# Patient Record
Sex: FEMALE | URBAN - METROPOLITAN AREA
[De-identification: names, ages, dates, MRNs, and addresses within clinical notes are randomized per-mention and may not be internally consistent; named-entity substitution may affect disease eponyms.]

---

## 2019-01-30 ENCOUNTER — APPOINTMENT (RX ONLY)
Dept: URBAN - METROPOLITAN AREA OTHER 13 | Facility: OTHER | Age: 74
Setting detail: DERMATOLOGY
End: 2019-01-30

## 2019-01-30 DIAGNOSIS — L68.0 HIRSUTISM: ICD-10-CM

## 2019-01-30 DIAGNOSIS — L70.8 OTHER ACNE: ICD-10-CM

## 2019-01-30 DIAGNOSIS — L81.0 POSTINFLAMMATORY HYPERPIGMENTATION: ICD-10-CM

## 2019-01-30 PROCEDURE — ? COUNSELING

## 2019-01-30 PROCEDURE — 99213 OFFICE O/P EST LOW 20 MIN: CPT

## 2019-01-30 PROCEDURE — ? PRESCRIPTION

## 2019-01-30 RX ORDER — HYDROQUINONE 4 %
CREAM (GRAM) TOPICAL
Qty: 1 | Refills: 3 | Status: ERX | COMMUNITY
Start: 2019-01-30

## 2019-01-30 RX ORDER — DOXYCYCLINE HYCLATE 100 MG/1
CAPSULE, GELATIN COATED ORAL
Qty: 30 | Refills: 3 | Status: ERX | COMMUNITY
Start: 2019-01-30

## 2019-01-30 RX ORDER — CLINDAMYCIN PHOSPHATE 10 MG/G
GEL TOPICAL
Qty: 1 | Refills: 1 | Status: ERX | COMMUNITY
Start: 2019-01-30

## 2019-01-30 RX ADMIN — CLINDAMYCIN PHOSPHATE: 10 GEL TOPICAL at 15:08

## 2019-01-30 RX ADMIN — Medication: at 15:07

## 2019-01-30 RX ADMIN — DOXYCYCLINE HYCLATE: 100 CAPSULE, GELATIN COATED ORAL at 15:09

## 2019-01-30 ASSESSMENT — LOCATION ZONE DERM: LOCATION ZONE: FACE

## 2019-01-30 ASSESSMENT — LOCATION SIMPLE DESCRIPTION DERM
LOCATION SIMPLE: CHIN
LOCATION SIMPLE: LEFT CHEEK
LOCATION SIMPLE: RIGHT CHEEK

## 2019-01-30 ASSESSMENT — LOCATION DETAILED DESCRIPTION DERM
LOCATION DETAILED: LEFT INFERIOR CENTRAL MALAR CHEEK
LOCATION DETAILED: LEFT SUPERIOR CENTRAL BUCCAL CHEEK
LOCATION DETAILED: RIGHT MEDIAL BUCCAL CHEEK
LOCATION DETAILED: RIGHT INFERIOR CENTRAL MALAR CHEEK
LOCATION DETAILED: RIGHT CHIN

## 2019-04-01 ENCOUNTER — APPOINTMENT (RX ONLY)
Dept: URBAN - METROPOLITAN AREA OTHER 13 | Facility: OTHER | Age: 74
Setting detail: DERMATOLOGY
End: 2019-04-01

## 2019-04-01 DIAGNOSIS — L81.0 POSTINFLAMMATORY HYPERPIGMENTATION: ICD-10-CM

## 2019-04-01 PROCEDURE — ? COUNSELING

## 2019-04-01 PROCEDURE — 99212 OFFICE O/P EST SF 10 MIN: CPT

## 2019-04-01 ASSESSMENT — LOCATION DETAILED DESCRIPTION DERM
LOCATION DETAILED: RIGHT INFERIOR CENTRAL MALAR CHEEK
LOCATION DETAILED: RIGHT CHIN
LOCATION DETAILED: LEFT INFERIOR CENTRAL MALAR CHEEK

## 2019-04-01 ASSESSMENT — LOCATION SIMPLE DESCRIPTION DERM
LOCATION SIMPLE: CHIN
LOCATION SIMPLE: LEFT CHEEK
LOCATION SIMPLE: RIGHT CHEEK

## 2019-04-01 ASSESSMENT — LOCATION ZONE DERM: LOCATION ZONE: FACE

## 2020-07-10 ENCOUNTER — OFFICE VISIT (OUTPATIENT)
Dept: URBAN - METROPOLITAN AREA CLINIC 54 | Facility: CLINIC | Age: 75
End: 2020-07-10

## 2020-07-10 RX ORDER — ROSUVASTATIN CALCIUM 40 MG/1
TABLET, FILM COATED ORAL
Qty: 0 | Refills: 0 | COMMUNITY
Start: 1900-01-01

## 2020-07-10 RX ORDER — CHLORHEXIDINE GLUCONATE 4 %
LIQUID (ML) TOPICAL
Qty: 0 | Refills: 0 | COMMUNITY
Start: 1900-01-01

## 2020-07-10 RX ORDER — ESTROGENS, CONJUGATED 0.62 MG/1
TABLET, FILM COATED ORAL
Qty: 0 | Refills: 0 | COMMUNITY
Start: 1900-01-01

## 2020-07-10 RX ORDER — SUCRALFATE 1 G/1
TABLET ORAL
Qty: 0 | Refills: 0 | COMMUNITY
Start: 1900-01-01

## 2020-07-10 RX ORDER — BUPROPION HYDROCHLORIDE 150 MG/1
TABLET, FILM COATED, EXTENDED RELEASE ORAL
Qty: 0 | Refills: 0 | COMMUNITY
Start: 1900-01-01

## 2020-07-10 RX ORDER — PANTOPRAZOLE SODIUM 40 MG/1
TAKE 1 TABLET BY ORAL ROUTE DAILY FOR 90 DAYS TABLET, DELAYED RELEASE ORAL 1
Qty: 90 | Refills: 2 | COMMUNITY
Start: 2020-03-06 | End: 2020-12-01

## 2020-07-10 RX ORDER — MILNACIPRAN HYDROCHLORIDE 50 MG/1
TABLET, FILM COATED ORAL
Qty: 0 | Refills: 0 | COMMUNITY
Start: 1900-01-01

## 2020-07-10 RX ORDER — ASPIRIN 81 MG/1
TABLET, COATED ORAL
Qty: 0 | Refills: 0 | COMMUNITY
Start: 1900-01-01

## 2020-07-20 ENCOUNTER — OFFICE VISIT (OUTPATIENT)
Dept: URBAN - METROPOLITAN AREA CLINIC 54 | Facility: CLINIC | Age: 75
End: 2020-07-20
Payer: MEDICARE

## 2020-07-20 DIAGNOSIS — K58.9 IBS (IRRITABLE BOWEL SYNDROME): ICD-10-CM

## 2020-07-20 DIAGNOSIS — K21.9 GERD: ICD-10-CM

## 2020-07-20 DIAGNOSIS — E66.9 OBESITY: ICD-10-CM

## 2020-07-20 DIAGNOSIS — Z12.11 ENCOUNTER FOR SCREENING COLONOSCOPY: ICD-10-CM

## 2020-07-20 DIAGNOSIS — K76.0 FATTY LIVER: ICD-10-CM

## 2020-07-20 PROCEDURE — 99214 OFFICE O/P EST MOD 30 MIN: CPT | Performed by: INTERNAL MEDICINE

## 2020-07-20 PROCEDURE — 83036 HEMOGLOBIN GLYCOSYLATED A1C: CPT | Performed by: INTERNAL MEDICINE

## 2020-07-20 RX ORDER — ROSUVASTATIN CALCIUM 40 MG/1
TABLET, FILM COATED ORAL
Qty: 0 | Refills: 0 | Status: ACTIVE | COMMUNITY
Start: 1900-01-01

## 2020-07-20 RX ORDER — SUCRALFATE 1 G/1
TABLET ORAL
Qty: 0 | Refills: 0 | COMMUNITY
Start: 1900-01-01

## 2020-07-20 RX ORDER — CHLORHEXIDINE GLUCONATE 4 %
LIQUID (ML) TOPICAL
Qty: 0 | Refills: 0 | Status: ACTIVE | COMMUNITY
Start: 1900-01-01

## 2020-07-20 RX ORDER — MILNACIPRAN HYDROCHLORIDE 50 MG/1
TABLET, FILM COATED ORAL
Qty: 0 | Refills: 0 | Status: ACTIVE | COMMUNITY
Start: 1900-01-01

## 2020-07-20 RX ORDER — BUPROPION HYDROCHLORIDE 150 MG/1
TABLET, FILM COATED, EXTENDED RELEASE ORAL
Qty: 0 | Refills: 0 | Status: ACTIVE | COMMUNITY
Start: 1900-01-01

## 2020-07-20 RX ORDER — PANTOPRAZOLE SODIUM 40 MG/1
1 TABLET TABLET, DELAYED RELEASE ORAL ONCE A DAY
Refills: 2 | Status: ACTIVE | COMMUNITY
Start: 2020-03-06

## 2020-07-20 RX ORDER — ESTROGENS, CONJUGATED 0.62 MG/1
TABLET, FILM COATED ORAL
Qty: 0 | Refills: 0 | Status: ACTIVE | COMMUNITY
Start: 1900-01-01

## 2020-07-20 RX ORDER — ASPIRIN 81 MG/1
TABLET, COATED ORAL
Qty: 0 | Refills: 0 | Status: ACTIVE | COMMUNITY
Start: 1900-01-01

## 2020-07-20 NOTE — HPI-TODAY'S VISIT:
The patient is a 73 year old female with PMH of CAD, HTN, HLD, DM II, obesity, s/p Nissen fundoplication, anxiety, depression, arthritis and anemia, who presents on referral from [facility], for a gastroenterology evaluation for above issues. She was recently hospitalized from 3/25-2/27 with similar complaints and  underwent cardiac evaluation. She had a LHC on 3/25 that showed Ost LM lesion is 50% stenosed. She is currenlty managed on ASA. She reports chronic intermittent chest pressure with associated heartburn, indigestion, reflux, nausea and dysphagia, now worse in past 2 months. She has been taking Protonix, Zantac and Carafate and has not been getting much relief. She sleeps on a wedge pillow due to reflux. Reports dysphagia to pills only. Denies any vomiting, odynophagia, unintentional weight loss. Her last EGD was several years ago by Dr. Alberto that required esophageal dilatation. Her last colonoscopy was in 2017 by Dr. Alberto that was normal. She has chronic constipation and has been taking Linzess 145 mcg daily for over a year. She has a BM every 4-5 days. She also reports bloating. She is taking OTC probiotic. Denies any hematochezia or melena. No family history of GI malignancy. She denies any etoh, tobacco or illicit drug use.    Follow Up 6/20/2019: Patient presents for routine follow up. She c/o early Am nausea and fullness. She tried Amitiza which did not work. Linzess has been too strong for her in the past. EGD was negative for esophageal obstruction with intact Nissen. She is on Savella and Lyrica for fibromyalgia. She is off Amitryptiline. These symptoms have been going on for several years. She is most bothered by constipation and bloating. LFTs elevation noted. No known risk factors.  Follow Up 9/20/2019: Pateint presents to schedule colonoscopy. Bowel function is unchanged at this time with alternating and diarrhea and constipation with bloating. No fevers, chills, weight loss. Dietary recs given in past but complaince is not sure. She has gained 13 lbs but c/o poor appetite.  Follow Up 2/28/20: Patient presents for routiner follow up. She c/o heartuburn, regurgitation with intermittent dysphagia. EGD in 2019 was negative for erosive/obstructive pathology. She is on Protonix 40 QD and Ranitidine at bedtime. She sleeps on a wedge. Dinner is usually around 7 and sleeps at 10. Comorbidities include fibromyalgia, RLS. She has gained 6 lbs.  Follow Up 7/20/20: Pateint presents for routine follow up. No new complaints. She has contacted [facility] and is following the low FODMAP diet with good results.

## 2020-07-21 LAB
A/G RATIO: 1.6
ALBUMIN: 3.9
ALKALINE PHOSPHATASE: 79
ALT (SGPT): 22
AST (SGOT): 27
BASO (ABSOLUTE): 0
BASOS: 0
BILIRUBIN, TOTAL: <0.2
BUN/CREATININE RATIO: 9
BUN: 10
CALCIUM: 9.5
CARBON DIOXIDE, TOTAL: 19
CHLORIDE: 109
CREATININE: 1.07
EGFR IF AFRICN AM: 60
EGFR IF NONAFRICN AM: 52
EOS (ABSOLUTE): 0.1
EOS: 1
GLOBULIN, TOTAL: 2.5
GLUCOSE: 107
HEMATOCRIT: 36.6
HEMATOLOGY COMMENTS:: (no result)
HEMOGLOBIN A1C: 6.2
HEMOGLOBIN: 11.6
IMMATURE CELLS: (no result)
IMMATURE GRANS (ABS): 0
IMMATURE GRANULOCYTES: 0
INR: 1.1
LYMPHS (ABSOLUTE): 1.9
LYMPHS: 41
MCH: 26.8
MCHC: 31.7
MCV: 85
MONOCYTES(ABSOLUTE): 0.6
MONOCYTES: 12
NEUTROPHILS (ABSOLUTE): 2.1
NEUTROPHILS: 46
NRBC: (no result)
PLATELETS: 235
POTASSIUM: 4.2
PROTEIN, TOTAL: 6.4
PROTHROMBIN TIME: 12.1
RBC: 4.33
RDW: 13.2
SODIUM: 146
WBC: 4.6

## 2021-01-05 ENCOUNTER — TELEPHONE ENCOUNTER (OUTPATIENT)
Dept: URBAN - METROPOLITAN AREA CLINIC 54 | Facility: CLINIC | Age: 76
End: 2021-01-05

## 2021-01-08 ENCOUNTER — LAB OUTSIDE AN ENCOUNTER (OUTPATIENT)
Dept: URBAN - NONMETROPOLITAN AREA CLINIC 4 | Facility: CLINIC | Age: 76
End: 2021-01-08

## 2021-01-08 ENCOUNTER — OFFICE VISIT (OUTPATIENT)
Dept: URBAN - NONMETROPOLITAN AREA CLINIC 4 | Facility: CLINIC | Age: 76
End: 2021-01-08
Payer: MEDICARE

## 2021-01-08 VITALS
TEMPERATURE: 97.7 F | WEIGHT: 191.6 LBS | HEART RATE: 72 BPM | SYSTOLIC BLOOD PRESSURE: 157 MMHG | BODY MASS INDEX: 33.95 KG/M2 | DIASTOLIC BLOOD PRESSURE: 126 MMHG | HEIGHT: 63 IN

## 2021-01-08 DIAGNOSIS — Z12.11 ENCOUNTER FOR SCREENING COLONOSCOPY: ICD-10-CM

## 2021-01-08 DIAGNOSIS — R13.10 DYSPHAGIA: ICD-10-CM

## 2021-01-08 DIAGNOSIS — K58.9 IBS (IRRITABLE BOWEL SYNDROME): ICD-10-CM

## 2021-01-08 DIAGNOSIS — K76.0 FATTY LIVER: ICD-10-CM

## 2021-01-08 DIAGNOSIS — E66.9 OBESITY: ICD-10-CM

## 2021-01-08 DIAGNOSIS — K21.9 GERD: ICD-10-CM

## 2021-01-08 PROCEDURE — 99213 OFFICE O/P EST LOW 20 MIN: CPT | Performed by: REGISTERED NURSE

## 2021-01-08 PROCEDURE — G8482 FLU IMMUNIZE ORDER/ADMIN: HCPCS | Performed by: REGISTERED NURSE

## 2021-01-08 PROCEDURE — G8427 DOCREV CUR MEDS BY ELIG CLIN: HCPCS | Performed by: REGISTERED NURSE

## 2021-01-08 PROCEDURE — 3017F COLORECTAL CA SCREEN DOC REV: CPT | Performed by: REGISTERED NURSE

## 2021-01-08 PROCEDURE — G8417 CALC BMI ABV UP PARAM F/U: HCPCS | Performed by: REGISTERED NURSE

## 2021-01-08 PROCEDURE — 1036F TOBACCO NON-USER: CPT | Performed by: REGISTERED NURSE

## 2021-01-08 RX ORDER — PANTOPRAZOLE SODIUM 40 MG/1
1 TABLET TABLET, DELAYED RELEASE ORAL ONCE A DAY
Refills: 2 | Status: ACTIVE | COMMUNITY

## 2021-01-08 RX ORDER — LINAGLIPTIN 5 MG/1
1 TABLET TABLET, FILM COATED ORAL ONCE A DAY
Status: ACTIVE | COMMUNITY

## 2021-01-08 RX ORDER — CONJUGATED ESTROGENS 0.62 MG/G
AS DIRECTED CREAM VAGINAL
Status: ACTIVE | COMMUNITY

## 2021-01-08 RX ORDER — PANTOPRAZOLE SODIUM 40 MG/1
1 TABLET TABLET, DELAYED RELEASE ORAL TWICE DAILY
Qty: 60 | Refills: 2 | OUTPATIENT
Start: 2021-01-08

## 2021-01-08 RX ORDER — CHLORHEXIDINE GLUCONATE 4 %
LIQUID (ML) TOPICAL
Qty: 0 | Refills: 0 | Status: DISCONTINUED | COMMUNITY

## 2021-01-08 RX ORDER — MELATONIN 5 MG
1 TABLET IN THE EVENING TABLET ORAL ONCE A DAY
Status: ACTIVE | COMMUNITY

## 2021-01-08 RX ORDER — ASPIRIN 81 MG/1
TABLET, COATED ORAL
Qty: 0 | Refills: 0 | Status: ACTIVE | COMMUNITY

## 2021-01-08 RX ORDER — NYSTATIN 100000 [USP'U]/G
1 APPLICATION CREAM TOPICAL TWICE A DAY
Status: ACTIVE | COMMUNITY

## 2021-01-08 RX ORDER — ESTROGENS, CONJUGATED 0.62 MG/1
TABLET, FILM COATED ORAL
Qty: 0 | Refills: 0 | Status: DISCONTINUED | COMMUNITY

## 2021-01-08 RX ORDER — DOXYCYCLINE HYCLATE 100 MG/1
1 TABLET TABLET, DELAYED RELEASE ORAL ONCE A DAY
Status: ACTIVE | COMMUNITY

## 2021-01-08 RX ORDER — PREGABALIN 200 MG/1
1 CAPSULE 1 TO 3 HOURS BEFORE BEDTIME CAPSULE ORAL ONCE A DAY
Status: ACTIVE | COMMUNITY

## 2021-01-08 RX ORDER — BUPROPION HYDROCHLORIDE 150 MG/1
TABLET, FILM COATED, EXTENDED RELEASE ORAL
Qty: 0 | Refills: 0 | Status: ACTIVE | COMMUNITY

## 2021-01-08 RX ORDER — ISOSORBIDE MONONITRATE 60 MG/1
1 TABLET IN THE MORNING TABLET, EXTENDED RELEASE ORAL ONCE A DAY
Status: ACTIVE | COMMUNITY

## 2021-01-08 RX ORDER — ROSUVASTATIN CALCIUM 40 MG/1
TABLET, FILM COATED ORAL
Qty: 0 | Refills: 0 | Status: ACTIVE | COMMUNITY

## 2021-01-08 RX ORDER — FAMOTIDINE 40 MG/1
1 TABLET AT BEDTIME TABLET, FILM COATED ORAL ONCE A DAY
Status: ACTIVE | COMMUNITY

## 2021-01-08 RX ORDER — HYDROXYZINE HYDROCHLORIDE 25 MG/1
1 TABLET AS NEEDED TABLET, FILM COATED ORAL
Status: ACTIVE | COMMUNITY

## 2021-01-08 RX ORDER — SUCRALFATE 1 G/1
TABLET ORAL
Qty: 0 | Refills: 0 | Status: ACTIVE | COMMUNITY

## 2021-01-08 RX ORDER — MILNACIPRAN HYDROCHLORIDE 50 MG/1
TABLET, FILM COATED ORAL
Qty: 0 | Refills: 0 | Status: ACTIVE | COMMUNITY

## 2021-01-08 RX ORDER — PANTOPRAZOLE SODIUM 40 MG/1
1 TABLET TABLET, DELAYED RELEASE ORAL ONCE A DAY
Qty: 30 TABLET | Refills: 0 | OUTPATIENT

## 2021-01-08 NOTE — PHYSICAL EXAM HENT:
Head,  normocephalic,  atraumatic,  Face,  Face within normal limits,  Ears,  External ears within normal limits, [place]

## 2021-01-08 NOTE — HPI-TODAY'S VISIT:
The patient is a 73 year old female with PMH of CAD, HTN, HLD, DM II, obesity, s/p Nissen fundoplication, anxiety, depression, arthritis and anemia, who presents on referral from [facility], for a gastroenterology evaluation for above issues. She was recently hospitalized from 3/25-2/27 with similar complaints and  underwent cardiac evaluation. She had a LHC on 3/25 that showed Ost LM lesion is 50% stenosed. She is currenlty managed on ASA. She reports chronic intermittent chest pressure with associated heartburn, indigestion, reflux, nausea and dysphagia, now worse in past 2 months. She has been taking Protonix, Zantac and Carafate and has not been getting much relief. She sleeps on a wedge pillow due to reflux. Reports dysphagia to pills only. Denies any vomiting, odynophagia, unintentional weight loss. Her last EGD was several years ago by Dr. Alberto that required esophageal dilatation. Her last colonoscopy was in 2017 by Dr. Alberto that was normal. She has chronic constipation and has been taking Linzess 145 mcg daily for over a year. She has a BM every 4-5 days. She also reports bloating. She is taking OTC probiotic. Denies any hematochezia or melena. No family history of GI malignancy. She denies any etoh, tobacco or illicit drug use.    Follow Up 6/20/2019: Patient presents for routine follow up. She c/o early Am nausea and fullness. She tried Amitiza which did not work. Linzess has been too strong for her in the past. EGD was negative for esophageal obstruction with intact Nissen. She is on Savella and Lyrica for fibromyalgia. She is off Amitryptiline. These symptoms have been going on for several years. She is most bothered by constipation and bloating. LFTs elevation noted. No known risk factors.  Follow Up 9/20/2019: Pateint presents to schedule colonoscopy. Bowel function is unchanged at this time with alternating and diarrhea and constipation with bloating. No fevers, chills, weight loss. Dietary recs given in past but complaince is not sure. She has gained 13 lbs but c/o poor appetite.  Follow Up 2/28/20: Patient presents for routiner follow up. She c/o heartuburn, regurgitation with intermittent dysphagia. EGD in 2019 was negative for erosive/obstructive pathology. She is on Protonix 40 QD and Ranitidine at bedtime. She sleeps on a wedge. Dinner is usually around 7 and sleeps at 10. Comorbidities include fibromyalgia, RLS. She has gained 6 lbs.  Follow Up 7/20/20: Pateint presents for routine follow up. No new complaints. She has contacted [facility] and is following the low FODMAP diet with good results.  FOllow up 1/8/21: Pt presents with c/o dysphagia, nausea, esophageal spasms and worsening acid reflux. She currently takes Protonix 40 mg daily and Pepcid 40 mg at bedtime, which is not helping. States she had an episode of choking recently and had to have heimlich maneuvor performed.

## 2021-02-16 ENCOUNTER — OFFICE VISIT (OUTPATIENT)
Dept: URBAN - METROPOLITAN AREA SURGERY CENTER 14 | Facility: SURGERY CENTER | Age: 76
End: 2021-02-16
Payer: MEDICARE

## 2021-02-16 ENCOUNTER — CLAIMS CREATED FROM THE CLAIM WINDOW (OUTPATIENT)
Dept: URBAN - METROPOLITAN AREA CLINIC 4 | Facility: CLINIC | Age: 76
End: 2021-02-16
Payer: MEDICARE

## 2021-02-16 DIAGNOSIS — R13.19 CERVICAL DYSPHAGIA: ICD-10-CM

## 2021-02-16 DIAGNOSIS — K21.00 GASTRO-ESOPHAGEAL REFLUX DISEASE WITH ESOPHAGITIS, WITHOUT BLEEDING: ICD-10-CM

## 2021-02-16 DIAGNOSIS — K21.9 ACID REFLUX: ICD-10-CM

## 2021-02-16 PROCEDURE — 88305 TISSUE EXAM BY PATHOLOGIST: CPT | Performed by: PATHOLOGY

## 2021-02-16 PROCEDURE — 88312 SPECIAL STAINS GROUP 1: CPT | Performed by: PATHOLOGY

## 2021-02-16 PROCEDURE — 43239 EGD BIOPSY SINGLE/MULTIPLE: CPT | Performed by: INTERNAL MEDICINE

## 2021-02-16 PROCEDURE — 43248 EGD GUIDE WIRE INSERTION: CPT | Performed by: INTERNAL MEDICINE

## 2021-02-16 PROCEDURE — G8907 PT DOC NO EVENTS ON DISCHARG: HCPCS | Performed by: INTERNAL MEDICINE

## 2021-02-16 RX ORDER — ISOSORBIDE MONONITRATE 60 MG/1
1 TABLET IN THE MORNING TABLET, EXTENDED RELEASE ORAL ONCE A DAY
Status: ACTIVE | COMMUNITY

## 2021-02-16 RX ORDER — HYDROXYZINE HYDROCHLORIDE 25 MG/1
1 TABLET AS NEEDED TABLET, FILM COATED ORAL
Status: ACTIVE | COMMUNITY

## 2021-02-16 RX ORDER — ASPIRIN 81 MG/1
TABLET, COATED ORAL
Qty: 0 | Refills: 0 | Status: ACTIVE | COMMUNITY

## 2021-02-16 RX ORDER — DOXYCYCLINE HYCLATE 100 MG/1
1 TABLET TABLET, DELAYED RELEASE ORAL ONCE A DAY
Status: ACTIVE | COMMUNITY

## 2021-02-16 RX ORDER — CONJUGATED ESTROGENS 0.62 MG/G
AS DIRECTED CREAM VAGINAL
Status: ACTIVE | COMMUNITY

## 2021-02-16 RX ORDER — MELATONIN 5 MG
1 TABLET IN THE EVENING TABLET ORAL ONCE A DAY
Status: ACTIVE | COMMUNITY

## 2021-02-16 RX ORDER — SUCRALFATE 1 G/1
TABLET ORAL
Qty: 0 | Refills: 0 | Status: ACTIVE | COMMUNITY

## 2021-02-16 RX ORDER — PREGABALIN 200 MG/1
1 CAPSULE 1 TO 3 HOURS BEFORE BEDTIME CAPSULE ORAL ONCE A DAY
Status: ACTIVE | COMMUNITY

## 2021-02-16 RX ORDER — NYSTATIN 100000 [USP'U]/G
1 APPLICATION CREAM TOPICAL TWICE A DAY
Status: ACTIVE | COMMUNITY

## 2021-02-16 RX ORDER — LINAGLIPTIN 5 MG/1
1 TABLET TABLET, FILM COATED ORAL ONCE A DAY
Status: ACTIVE | COMMUNITY

## 2021-02-16 RX ORDER — PANTOPRAZOLE SODIUM 40 MG/1
1 TABLET TABLET, DELAYED RELEASE ORAL TWICE DAILY
Qty: 60 | Refills: 2 | Status: ACTIVE | COMMUNITY
Start: 2021-01-08

## 2021-02-16 RX ORDER — ROSUVASTATIN CALCIUM 40 MG/1
TABLET, FILM COATED ORAL
Qty: 0 | Refills: 0 | Status: ACTIVE | COMMUNITY

## 2021-02-16 RX ORDER — MILNACIPRAN HYDROCHLORIDE 50 MG/1
TABLET, FILM COATED ORAL
Qty: 0 | Refills: 0 | Status: ACTIVE | COMMUNITY

## 2021-02-16 RX ORDER — BUPROPION HYDROCHLORIDE 150 MG/1
TABLET, FILM COATED, EXTENDED RELEASE ORAL
Qty: 0 | Refills: 0 | Status: ACTIVE | COMMUNITY

## 2021-02-16 RX ORDER — FAMOTIDINE 40 MG/1
1 TABLET AT BEDTIME TABLET, FILM COATED ORAL ONCE A DAY
Status: ACTIVE | COMMUNITY

## 2021-03-09 ENCOUNTER — OFFICE VISIT (OUTPATIENT)
Dept: URBAN - NONMETROPOLITAN AREA CLINIC 4 | Facility: CLINIC | Age: 76
End: 2021-03-09

## 2021-03-12 ENCOUNTER — OFFICE VISIT (OUTPATIENT)
Dept: URBAN - METROPOLITAN AREA CLINIC 54 | Facility: CLINIC | Age: 76
End: 2021-03-12
Payer: MEDICARE

## 2021-03-12 ENCOUNTER — WEB ENCOUNTER (OUTPATIENT)
Dept: URBAN - METROPOLITAN AREA CLINIC 54 | Facility: CLINIC | Age: 76
End: 2021-03-12

## 2021-03-12 DIAGNOSIS — K21.9 GERD: ICD-10-CM

## 2021-03-12 DIAGNOSIS — K58.9 IBS (IRRITABLE BOWEL SYNDROME): ICD-10-CM

## 2021-03-12 DIAGNOSIS — R13.19 CERVICAL DYSPHAGIA: ICD-10-CM

## 2021-03-12 DIAGNOSIS — K76.0 FATTY (CHANGE OF) LIVER, NOT ELSEWHERE CLASSIFIED: ICD-10-CM

## 2021-03-12 PROBLEM — 10743008: Status: ACTIVE | Noted: 2021-03-12

## 2021-03-12 PROBLEM — 313436004: Status: ACTIVE | Noted: 2021-03-12

## 2021-03-12 PROBLEM — 72042002: Status: ACTIVE | Noted: 2021-03-12

## 2021-03-12 PROBLEM — 414916001 OBESITY: Status: ACTIVE | Noted: 2021-03-12

## 2021-03-12 PROBLEM — 302914006: Status: ACTIVE | Noted: 2021-03-12

## 2021-03-12 PROCEDURE — 91065 BREATH HYDROGEN/METHANE TEST: CPT | Performed by: INTERNAL MEDICINE

## 2021-03-12 PROCEDURE — 99214 OFFICE O/P EST MOD 30 MIN: CPT | Performed by: INTERNAL MEDICINE

## 2021-03-12 RX ORDER — PANTOPRAZOLE SODIUM 40 MG/1
1 TABLET TABLET, DELAYED RELEASE ORAL TWICE DAILY
Qty: 60 | Refills: 2 | Status: ACTIVE | COMMUNITY
Start: 2021-01-08

## 2021-03-12 RX ORDER — CONJUGATED ESTROGENS 0.62 MG/G
AS DIRECTED CREAM VAGINAL
Status: ACTIVE | COMMUNITY

## 2021-03-12 RX ORDER — DOXYCYCLINE HYCLATE 100 MG/1
1 TABLET TABLET, DELAYED RELEASE ORAL ONCE A DAY
Status: ACTIVE | COMMUNITY

## 2021-03-12 RX ORDER — LINAGLIPTIN 5 MG/1
1 TABLET TABLET, FILM COATED ORAL ONCE A DAY
Status: ACTIVE | COMMUNITY

## 2021-03-12 RX ORDER — SUCRALFATE 1 G/1
TABLET ORAL
Qty: 0 | Refills: 0 | Status: ACTIVE | COMMUNITY

## 2021-03-12 RX ORDER — HYDROXYZINE HYDROCHLORIDE 25 MG/1
1 TABLET AS NEEDED TABLET, FILM COATED ORAL
Status: ACTIVE | COMMUNITY

## 2021-03-12 RX ORDER — ISOSORBIDE MONONITRATE 60 MG/1
1 TABLET IN THE MORNING TABLET, EXTENDED RELEASE ORAL ONCE A DAY
Status: ACTIVE | COMMUNITY

## 2021-03-12 RX ORDER — BUPROPION HYDROCHLORIDE 150 MG/1
TABLET, FILM COATED, EXTENDED RELEASE ORAL
Qty: 0 | Refills: 0 | Status: ACTIVE | COMMUNITY

## 2021-03-12 RX ORDER — NYSTATIN 100000 [USP'U]/G
1 APPLICATION CREAM TOPICAL TWICE A DAY
Status: ACTIVE | COMMUNITY

## 2021-03-12 RX ORDER — ASPIRIN 81 MG/1
TABLET, COATED ORAL
Qty: 0 | Refills: 0 | Status: ACTIVE | COMMUNITY

## 2021-03-12 RX ORDER — MELATONIN 5 MG
1 TABLET IN THE EVENING TABLET ORAL ONCE A DAY
Status: ACTIVE | COMMUNITY

## 2021-03-12 RX ORDER — MILNACIPRAN HYDROCHLORIDE 50 MG/1
TABLET, FILM COATED ORAL
Qty: 0 | Refills: 0 | Status: ACTIVE | COMMUNITY

## 2021-03-12 RX ORDER — ROSUVASTATIN CALCIUM 40 MG/1
TABLET, FILM COATED ORAL
Qty: 0 | Refills: 0 | Status: ACTIVE | COMMUNITY

## 2021-03-12 RX ORDER — PREGABALIN 200 MG/1
1 CAPSULE 1 TO 3 HOURS BEFORE BEDTIME CAPSULE ORAL ONCE A DAY
Status: ACTIVE | COMMUNITY

## 2021-03-12 RX ORDER — FAMOTIDINE 40 MG/1
1 TABLET AT BEDTIME TABLET, FILM COATED ORAL ONCE A DAY
Status: ACTIVE | COMMUNITY

## 2021-03-12 NOTE — PHYSICAL EXAM HENT:
Head,  normocephalic,  atraumatic,  Face,  Face within normal limits,  Ears,  External ears within normal limits, [place]

## 2021-03-12 NOTE — HPI-TODAY'S VISIT:
The patient is a 73 year old female with PMH of CAD, HTN, HLD, DM II, obesity, s/p Nissen fundoplication, anxiety, depression, arthritis and anemia, who presents on referral from [facility], for a gastroenterology evaluation for above issues. She was recently hospitalized from 3/25-2/27 with similar complaints and  underwent cardiac evaluation. She had a LHC on 3/25 that showed Ost LM lesion is 50% stenosed. She is currenlty managed on ASA. She reports chronic intermittent chest pressure with associated heartburn, indigestion, reflux, nausea and dysphagia, now worse in past 2 months. She has been taking Protonix, Zantac and Carafate and has not been getting much relief. She sleeps on a wedge pillow due to reflux. Reports dysphagia to pills only. Denies any vomiting, odynophagia, unintentional weight loss. Her last EGD was several years ago by Dr. Alberto that required esophageal dilatation. Her last colonoscopy was in 2017 by Dr. Alberto that was normal. She has chronic constipation and has been taking Linzess 145 mcg daily for over a year. She has a BM every 4-5 days. She also reports bloating. She is taking OTC probiotic. Denies any hematochezia or melena. No family history of GI malignancy. She denies any etoh, tobacco or illicit drug use.    Follow Up 6/20/2019: Patient presents for routine follow up. She c/o early Am nausea and fullness. She tried Amitiza which did not work. Linzess has been too strong for her in the past. EGD was negative for esophageal obstruction with intact Nissen. She is on Savella and Lyrica for fibromyalgia. She is off Amitryptiline. These symptoms have been going on for several years. She is most bothered by constipation and bloating. LFTs elevation noted. No known risk factors.  Follow Up 9/20/2019: Pateint presents to schedule colonoscopy. Bowel function is unchanged at this time with alternating and diarrhea and constipation with bloating. No fevers, chills, weight loss. Dietary recs given in past but complaince is not sure. She has gained 13 lbs but c/o poor appetite.  Follow Up 2/28/20: Patient presents for routiner follow up. She c/o heartuburn, regurgitation with intermittent dysphagia. EGD in 2019 was negative for erosive/obstructive pathology. She is on Protonix 40 QD and Ranitidine at bedtime. She sleeps on a wedge. Dinner is usually around 7 and sleeps at 10. Comorbidities include fibromyalgia, RLS. She has gained 6 lbs.  Follow Up 7/20/20: Pateint presents for routine follow up. No new complaints. She has contacted [facility] and is following the low FODMAP diet with good results.  FOllow up 1/8/21: Pt presents with c/o dysphagia, nausea, esophageal spasms and worsening acid reflux. She currently takes Protonix 40 mg daily and Pepcid 40 mg at bedtime, which is not helping. States she had an episode of choking recently and had to have heimlich maneuvor performed.  Follow Up 3/12/21: Patient presents for post EGD follow up. EGD showed C0M2 barretts but negative pathology. Previous EGD in 2018 showed C0M1 [facility]'s and also negative pathology. Esophagus was nonobstructive and dilated with 54 F Savary with good results. She c/o fecal incontinence and has to wear pads. Symptoms going of for a year. She denies watery stool. She has tried fiber which caused ? constipation. Her last HbA1C was 7%.

## 2021-03-19 LAB — PANCREATIC ELASTASE, FECAL: 391

## 2021-03-26 ENCOUNTER — OFFICE VISIT (OUTPATIENT)
Dept: URBAN - METROPOLITAN AREA CLINIC 53 | Facility: CLINIC | Age: 76
End: 2021-03-26
Payer: MEDICARE

## 2021-03-26 DIAGNOSIS — R14.0 BLOATING: ICD-10-CM

## 2021-03-26 PROCEDURE — 91065 BREATH HYDROGEN/METHANE TEST: CPT | Performed by: INTERNAL MEDICINE

## 2021-03-29 ENCOUNTER — TELEPHONE ENCOUNTER (OUTPATIENT)
Dept: URBAN - METROPOLITAN AREA CLINIC 92 | Facility: CLINIC | Age: 76
End: 2021-03-29

## 2021-03-29 RX ORDER — LINAGLIPTIN 5 MG/1
1 TABLET TABLET, FILM COATED ORAL ONCE A DAY
Status: ACTIVE | COMMUNITY

## 2021-03-29 RX ORDER — CONJUGATED ESTROGENS 0.62 MG/G
AS DIRECTED CREAM VAGINAL
Status: ACTIVE | COMMUNITY

## 2021-03-29 RX ORDER — ISOSORBIDE MONONITRATE 60 MG/1
1 TABLET IN THE MORNING TABLET, EXTENDED RELEASE ORAL ONCE A DAY
Status: ACTIVE | COMMUNITY

## 2021-03-29 RX ORDER — HYDROXYZINE HYDROCHLORIDE 25 MG/1
1 TABLET AS NEEDED TABLET, FILM COATED ORAL
Status: ACTIVE | COMMUNITY

## 2021-03-29 RX ORDER — PREGABALIN 200 MG/1
1 CAPSULE 1 TO 3 HOURS BEFORE BEDTIME CAPSULE ORAL ONCE A DAY
Status: ACTIVE | COMMUNITY

## 2021-03-29 RX ORDER — MELATONIN 5 MG
1 TABLET IN THE EVENING TABLET ORAL ONCE A DAY
Status: ACTIVE | COMMUNITY

## 2021-03-29 RX ORDER — BUPROPION HYDROCHLORIDE 150 MG/1
TABLET, FILM COATED, EXTENDED RELEASE ORAL
Qty: 0 | Refills: 0 | Status: ACTIVE | COMMUNITY

## 2021-03-29 RX ORDER — NYSTATIN 100000 [USP'U]/G
1 APPLICATION CREAM TOPICAL TWICE A DAY
Status: ACTIVE | COMMUNITY

## 2021-03-29 RX ORDER — SUCRALFATE 1 G/1
TABLET ORAL
Qty: 0 | Refills: 0 | Status: ACTIVE | COMMUNITY

## 2021-03-29 RX ORDER — FAMOTIDINE 40 MG/1
1 TABLET AT BEDTIME TABLET, FILM COATED ORAL ONCE A DAY
Status: ACTIVE | COMMUNITY

## 2021-03-29 RX ORDER — NEOMYCIN SULFATE 500 MG/1
1 TABLET TABLET ORAL BID
Qty: 28 TABLET | Refills: 0 | OUTPATIENT
Start: 2021-03-29

## 2021-03-29 RX ORDER — ROSUVASTATIN CALCIUM 40 MG/1
TABLET, FILM COATED ORAL
Qty: 0 | Refills: 0 | Status: ACTIVE | COMMUNITY

## 2021-03-29 RX ORDER — PANTOPRAZOLE SODIUM 40 MG/1
1 TABLET TABLET, DELAYED RELEASE ORAL TWICE DAILY
Qty: 60 | Refills: 2 | Status: ACTIVE | COMMUNITY
Start: 2021-01-08

## 2021-03-29 RX ORDER — MILNACIPRAN HYDROCHLORIDE 50 MG/1
TABLET, FILM COATED ORAL
Qty: 0 | Refills: 0 | Status: ACTIVE | COMMUNITY

## 2021-03-29 RX ORDER — ASPIRIN 81 MG/1
TABLET, COATED ORAL
Qty: 0 | Refills: 0 | Status: ACTIVE | COMMUNITY

## 2021-03-29 RX ORDER — DOXYCYCLINE HYCLATE 100 MG/1
1 TABLET TABLET, DELAYED RELEASE ORAL ONCE A DAY
Status: ACTIVE | COMMUNITY

## 2021-04-20 ENCOUNTER — TELEPHONE ENCOUNTER (OUTPATIENT)
Dept: URBAN - METROPOLITAN AREA CLINIC 54 | Facility: CLINIC | Age: 76
End: 2021-04-20

## 2021-04-22 ENCOUNTER — LAB OUTSIDE AN ENCOUNTER (OUTPATIENT)
Dept: URBAN - METROPOLITAN AREA CLINIC 54 | Facility: CLINIC | Age: 76
End: 2021-04-22

## 2021-05-04 ENCOUNTER — TELEPHONE ENCOUNTER (OUTPATIENT)
Dept: URBAN - METROPOLITAN AREA CLINIC 92 | Facility: CLINIC | Age: 76
End: 2021-05-04

## 2021-06-11 ENCOUNTER — OFFICE VISIT (OUTPATIENT)
Dept: URBAN - METROPOLITAN AREA CLINIC 54 | Facility: CLINIC | Age: 76
End: 2021-06-11

## 2021-06-11 RX ORDER — ASPIRIN 81 MG/1
TABLET, COATED ORAL
Qty: 0 | Refills: 0 | Status: ACTIVE | COMMUNITY

## 2021-06-11 RX ORDER — ROSUVASTATIN CALCIUM 40 MG/1
TABLET, FILM COATED ORAL
Qty: 0 | Refills: 0 | Status: ACTIVE | COMMUNITY

## 2021-06-11 RX ORDER — FAMOTIDINE 40 MG/1
1 TABLET AT BEDTIME TABLET, FILM COATED ORAL ONCE A DAY
Status: ACTIVE | COMMUNITY

## 2021-06-11 RX ORDER — PANTOPRAZOLE SODIUM 40 MG/1
1 TABLET TABLET, DELAYED RELEASE ORAL TWICE DAILY
Qty: 60 | Refills: 2 | Status: ACTIVE | COMMUNITY
Start: 2021-01-08

## 2021-06-11 RX ORDER — PREGABALIN 200 MG/1
1 CAPSULE 1 TO 3 HOURS BEFORE BEDTIME CAPSULE ORAL ONCE A DAY
Status: ACTIVE | COMMUNITY

## 2021-06-11 RX ORDER — DOXYCYCLINE HYCLATE 100 MG/1
1 TABLET TABLET, DELAYED RELEASE ORAL ONCE A DAY
Status: ACTIVE | COMMUNITY

## 2021-06-11 RX ORDER — CONJUGATED ESTROGENS 0.62 MG/G
AS DIRECTED CREAM VAGINAL
Status: ACTIVE | COMMUNITY

## 2021-06-11 RX ORDER — MILNACIPRAN HYDROCHLORIDE 50 MG/1
TABLET, FILM COATED ORAL
Qty: 0 | Refills: 0 | Status: ACTIVE | COMMUNITY

## 2021-06-11 RX ORDER — ISOSORBIDE MONONITRATE 60 MG/1
1 TABLET IN THE MORNING TABLET, EXTENDED RELEASE ORAL ONCE A DAY
Status: ACTIVE | COMMUNITY

## 2021-06-11 RX ORDER — HYDROXYZINE HYDROCHLORIDE 25 MG/1
1 TABLET AS NEEDED TABLET, FILM COATED ORAL
Status: ACTIVE | COMMUNITY

## 2021-06-11 RX ORDER — LINAGLIPTIN 5 MG/1
1 TABLET TABLET, FILM COATED ORAL ONCE A DAY
Status: ACTIVE | COMMUNITY

## 2021-06-11 RX ORDER — NYSTATIN 100000 [USP'U]/G
1 APPLICATION CREAM TOPICAL TWICE A DAY
Status: ACTIVE | COMMUNITY

## 2021-06-11 RX ORDER — BUPROPION HYDROCHLORIDE 150 MG/1
TABLET, FILM COATED, EXTENDED RELEASE ORAL
Qty: 0 | Refills: 0 | Status: ACTIVE | COMMUNITY

## 2021-06-11 RX ORDER — MELATONIN 5 MG
1 TABLET IN THE EVENING TABLET ORAL ONCE A DAY
Status: ACTIVE | COMMUNITY

## 2021-06-11 RX ORDER — SUCRALFATE 1 G/1
TABLET ORAL
Qty: 0 | Refills: 0 | Status: ACTIVE | COMMUNITY

## 2021-06-11 RX ORDER — NEOMYCIN SULFATE 500 MG/1
1 TABLET TABLET ORAL BID
Qty: 28 TABLET | Refills: 0 | Status: ACTIVE | COMMUNITY
Start: 2021-03-29

## 2021-07-21 ENCOUNTER — OFFICE VISIT (OUTPATIENT)
Dept: URBAN - METROPOLITAN AREA CLINIC 78 | Facility: CLINIC | Age: 76
End: 2021-07-21

## 2021-07-21 RX ORDER — PREGABALIN 200 MG/1
1 CAPSULE 1 TO 3 HOURS BEFORE BEDTIME CAPSULE ORAL ONCE A DAY
Status: ACTIVE | COMMUNITY

## 2021-07-21 RX ORDER — NEOMYCIN SULFATE 500 MG/1
1 TABLET TABLET ORAL BID
Qty: 28 TABLET | Refills: 0 | Status: ACTIVE | COMMUNITY
Start: 2021-03-29

## 2021-07-21 RX ORDER — PANTOPRAZOLE SODIUM 40 MG/1
1 TABLET TABLET, DELAYED RELEASE ORAL TWICE DAILY
Qty: 60 | Refills: 2 | Status: ACTIVE | COMMUNITY
Start: 2021-01-08

## 2021-07-21 RX ORDER — CONJUGATED ESTROGENS 0.62 MG/G
AS DIRECTED CREAM VAGINAL
Status: ACTIVE | COMMUNITY

## 2021-07-21 RX ORDER — HYDROXYZINE HYDROCHLORIDE 25 MG/1
1 TABLET AS NEEDED TABLET, FILM COATED ORAL
Status: ACTIVE | COMMUNITY

## 2021-07-21 RX ORDER — ASPIRIN 81 MG/1
TABLET, COATED ORAL
Qty: 0 | Refills: 0 | Status: ACTIVE | COMMUNITY

## 2021-07-21 RX ORDER — MILNACIPRAN HYDROCHLORIDE 50 MG/1
TABLET, FILM COATED ORAL
Qty: 0 | Refills: 0 | Status: ACTIVE | COMMUNITY

## 2021-07-21 RX ORDER — MELATONIN 5 MG
1 TABLET IN THE EVENING TABLET ORAL ONCE A DAY
Status: ACTIVE | COMMUNITY

## 2021-07-21 RX ORDER — BUPROPION HYDROCHLORIDE 150 MG/1
TABLET, FILM COATED, EXTENDED RELEASE ORAL
Qty: 0 | Refills: 0 | Status: ACTIVE | COMMUNITY

## 2021-07-21 RX ORDER — LINAGLIPTIN 5 MG/1
1 TABLET TABLET, FILM COATED ORAL ONCE A DAY
Status: ACTIVE | COMMUNITY

## 2021-07-21 RX ORDER — SUCRALFATE 1 G/1
TABLET ORAL
Qty: 0 | Refills: 0 | Status: ACTIVE | COMMUNITY

## 2021-07-21 RX ORDER — FAMOTIDINE 40 MG/1
1 TABLET AT BEDTIME TABLET, FILM COATED ORAL ONCE A DAY
Status: ACTIVE | COMMUNITY

## 2021-07-21 RX ORDER — ROSUVASTATIN CALCIUM 40 MG/1
TABLET, FILM COATED ORAL
Qty: 0 | Refills: 0 | Status: ACTIVE | COMMUNITY

## 2021-07-21 RX ORDER — NYSTATIN 100000 [USP'U]/G
1 APPLICATION CREAM TOPICAL TWICE A DAY
Status: ACTIVE | COMMUNITY

## 2021-07-21 RX ORDER — DOXYCYCLINE HYCLATE 100 MG/1
1 TABLET TABLET, DELAYED RELEASE ORAL ONCE A DAY
Status: ACTIVE | COMMUNITY

## 2021-07-21 RX ORDER — ISOSORBIDE MONONITRATE 60 MG/1
1 TABLET IN THE MORNING TABLET, EXTENDED RELEASE ORAL ONCE A DAY
Status: ACTIVE | COMMUNITY

## 2021-07-22 ENCOUNTER — OFFICE VISIT (OUTPATIENT)
Dept: URBAN - NONMETROPOLITAN AREA CLINIC 4 | Facility: CLINIC | Age: 76
End: 2021-07-22
Payer: MEDICARE

## 2021-07-22 ENCOUNTER — WEB ENCOUNTER (OUTPATIENT)
Dept: URBAN - NONMETROPOLITAN AREA CLINIC 4 | Facility: CLINIC | Age: 76
End: 2021-07-22

## 2021-07-22 DIAGNOSIS — R13.19 ESOPHAGEAL DYSPHAGIA: ICD-10-CM

## 2021-07-22 DIAGNOSIS — K31.84 GASTROPARESIS: ICD-10-CM

## 2021-07-22 PROCEDURE — 99214 OFFICE O/P EST MOD 30 MIN: CPT | Performed by: INTERNAL MEDICINE

## 2021-07-22 RX ORDER — PANTOPRAZOLE SODIUM 40 MG/1
1 TABLET TABLET, DELAYED RELEASE ORAL TWICE DAILY
Qty: 60 | Refills: 2 | Status: ACTIVE | COMMUNITY

## 2021-07-22 RX ORDER — MELATONIN 5 MG
1 TABLET IN THE EVENING TABLET ORAL ONCE A DAY
Status: ACTIVE | COMMUNITY

## 2021-07-22 RX ORDER — ROSUVASTATIN CALCIUM 40 MG/1
TABLET, FILM COATED ORAL
Qty: 0 | Refills: 0 | Status: ACTIVE | COMMUNITY

## 2021-07-22 RX ORDER — NEOMYCIN SULFATE 500 MG/1
1 TABLET TABLET ORAL BID
Qty: 28 TABLET | Refills: 0 | Status: ACTIVE | COMMUNITY
Start: 2021-03-29

## 2021-07-22 RX ORDER — PREGABALIN 200 MG/1
1 CAPSULE 1 TO 3 HOURS BEFORE BEDTIME CAPSULE ORAL ONCE A DAY
Status: ACTIVE | COMMUNITY

## 2021-07-22 RX ORDER — BUPROPION HYDROCHLORIDE 150 MG/1
TABLET, FILM COATED, EXTENDED RELEASE ORAL
Qty: 0 | Refills: 0 | Status: ACTIVE | COMMUNITY

## 2021-07-22 RX ORDER — DOXYCYCLINE HYCLATE 100 MG/1
1 TABLET TABLET, DELAYED RELEASE ORAL ONCE A DAY
Status: ACTIVE | COMMUNITY

## 2021-07-22 RX ORDER — HYDROXYZINE HYDROCHLORIDE 25 MG/1
1 TABLET AS NEEDED TABLET, FILM COATED ORAL
Status: ACTIVE | COMMUNITY

## 2021-07-22 RX ORDER — NYSTATIN 100000 [USP'U]/G
1 APPLICATION CREAM TOPICAL TWICE A DAY
Status: ACTIVE | COMMUNITY

## 2021-07-22 RX ORDER — ASPIRIN 81 MG/1
TABLET, COATED ORAL
Qty: 0 | Refills: 0 | Status: ACTIVE | COMMUNITY

## 2021-07-22 RX ORDER — SUCRALFATE 1 G/1
TABLET ORAL
Qty: 0 | Refills: 0 | Status: ACTIVE | COMMUNITY

## 2021-07-22 RX ORDER — FAMOTIDINE 40 MG/1
1 TABLET AT BEDTIME TABLET, FILM COATED ORAL ONCE A DAY
Status: ACTIVE | COMMUNITY

## 2021-07-22 RX ORDER — MILNACIPRAN HYDROCHLORIDE 50 MG/1
TABLET, FILM COATED ORAL
Qty: 0 | Refills: 0 | Status: ACTIVE | COMMUNITY

## 2021-07-22 RX ORDER — ISOSORBIDE MONONITRATE 60 MG/1
1 TABLET IN THE MORNING TABLET, EXTENDED RELEASE ORAL ONCE A DAY
Status: ACTIVE | COMMUNITY

## 2021-07-22 RX ORDER — CONJUGATED ESTROGENS 0.62 MG/G
AS DIRECTED CREAM VAGINAL
Status: ACTIVE | COMMUNITY

## 2021-07-22 RX ORDER — LINAGLIPTIN 5 MG/1
1 TABLET TABLET, FILM COATED ORAL ONCE A DAY
Status: ACTIVE | COMMUNITY

## 2021-07-22 NOTE — HPI-TODAY'S VISIT:
The patient is a 73 year old female with PMH of CAD, HTN, HLD, DM II, obesity, s/p Nissen fundoplication, anxiety, depression, arthritis and anemia, who presents on referral from [facility], for a gastroenterology evaluation for above issues. She was recently hospitalized from 3/25-2/27 with similar complaints and  underwent cardiac evaluation. She had a LHC on 3/25 that showed Ost LM lesion is 50% stenosed. She is currenlty managed on ASA. She reports chronic intermittent chest pressure with associated heartburn, indigestion, reflux, nausea and dysphagia, now worse in past 2 months. She has been taking Protonix, Zantac and Carafate and has not been getting much relief. She sleeps on a wedge pillow due to reflux. Reports dysphagia to pills only. Denies any vomiting, odynophagia, unintentional weight loss. Her last EGD was several years ago by Dr. Alberto that required esophageal dilatation. Her last colonoscopy was in 2017 by Dr. Alberto that was normal. She has chronic constipation and has been taking Linzess 145 mcg daily for over a year. She has a BM every 4-5 days. She also reports bloating. She is taking OTC probiotic. Denies any hematochezia or melena. No family history of GI malignancy. She denies any etoh, tobacco or illicit drug use.    Follow Up 6/20/2019: Patient presents for routine follow up. She c/o early Am nausea and fullness. She tried Amitiza which did not work. Linzess has been too strong for her in the past. EGD was negative for esophageal obstruction with intact Nissen. She is on Savella and Lyrica for fibromyalgia. She is off Amitryptiline. These symptoms have been going on for several years. She is most bothered by constipation and bloating. LFTs elevation noted. No known risk factors.  Follow Up 9/20/2019: Pateint presents to schedule colonoscopy. Bowel function is unchanged at this time with alternating and diarrhea and constipation with bloating. No fevers, chills, weight loss. Dietary recs given in past but complaince is not sure. She has gained 13 lbs but c/o poor appetite.  Follow Up 2/28/20: Patient presents for routiner follow up. She c/o heartuburn, regurgitation with intermittent dysphagia. EGD in 2019 was negative for erosive/obstructive pathology. She is on Protonix 40 QD and Ranitidine at bedtime. She sleeps on a wedge. Dinner is usually around 7 and sleeps at 10. Comorbidities include fibromyalgia, RLS. She has gained 6 lbs.  Follow Up 7/20/20: Pateint presents for routine follow up. No new complaints. She has contacted [facility] and is following the low FODMAP diet with good results.  FOllow up 1/8/21: Pt presents with c/o dysphagia, nausea, esophageal spasms and worsening acid reflux. She currently takes Protonix 40 mg daily and Pepcid 40 mg at bedtime, which is not helping. States she had an episode of choking recently and had to have heimlich maneuvor performed.  Follow Up 3/12/21: Patient presents for post EGD follow up. EGD showed C0M2 barretts but negative pathology. Previous EGD in 2018 showed C0M1 [facility]'s and also negative pathology. Esophagus was nonobstructive and dilated with 54 F Savary with good results. She c/o fecal incontinence and has to wear pads. Symptoms going of for a year. She denies watery stool. She has tried fiber which caused ? constipation. Her last HbA1C was 7%.  7-22-21 Pt reports that she still has dysphagia.  Pt reports that she had esophageal dilation and not without relief. Pt rpeorts that she has terrible acid reflux worse at night. Pt with gastroparesis and is taking pain medications as well on regular basis.

## 2021-08-05 ENCOUNTER — TELEPHONE ENCOUNTER (OUTPATIENT)
Dept: URBAN - METROPOLITAN AREA CLINIC 92 | Facility: CLINIC | Age: 76
End: 2021-08-05

## 2021-08-20 PROBLEM — 40739000 DYSPHAGIA: Status: ACTIVE | Noted: 2021-01-08

## 2021-08-20 PROBLEM — 10743008 IRRITABLE BOWEL SYNDROME: Status: ACTIVE | Noted: 2021-03-12

## 2021-08-20 PROBLEM — 235675006: Status: ACTIVE | Noted: 2021-07-22

## 2021-08-20 PROBLEM — 197321007 FATTY LIVER: Status: ACTIVE | Noted: 2021-03-12

## 2021-09-08 ENCOUNTER — OFFICE VISIT (OUTPATIENT)
Dept: URBAN - METROPOLITAN AREA SURGERY CENTER 14 | Facility: SURGERY CENTER | Age: 76
End: 2021-09-08
Payer: MEDICARE

## 2021-09-08 ENCOUNTER — CLAIMS CREATED FROM THE CLAIM WINDOW (OUTPATIENT)
Dept: URBAN - METROPOLITAN AREA CLINIC 4 | Facility: CLINIC | Age: 76
End: 2021-09-08
Payer: MEDICARE

## 2021-09-08 DIAGNOSIS — K22.4 CORKSCREW ESOPHAGUS: ICD-10-CM

## 2021-09-08 DIAGNOSIS — R13.19 CERVICAL DYSPHAGIA: ICD-10-CM

## 2021-09-08 DIAGNOSIS — K21.9 ACID REFLUX: ICD-10-CM

## 2021-09-08 DIAGNOSIS — T18.2XXA FOREIGN BODY IN STOMACH: ICD-10-CM

## 2021-09-08 DIAGNOSIS — K21.9 GASTRO-ESOPHAGEAL REFLUX DISEASE WITHOUT ESOPHAGITIS: ICD-10-CM

## 2021-09-08 PROCEDURE — 88305 TISSUE EXAM BY PATHOLOGIST: CPT | Performed by: PATHOLOGY

## 2021-09-08 PROCEDURE — 43450 DILATE ESOPHAGUS 1/MULT PASS: CPT | Performed by: INTERNAL MEDICINE

## 2021-09-08 PROCEDURE — 43239 EGD BIOPSY SINGLE/MULTIPLE: CPT | Performed by: INTERNAL MEDICINE

## 2021-09-08 PROCEDURE — G8907 PT DOC NO EVENTS ON DISCHARG: HCPCS | Performed by: INTERNAL MEDICINE

## 2021-09-08 RX ORDER — MELATONIN 5 MG
1 TABLET IN THE EVENING TABLET ORAL ONCE A DAY
Status: ACTIVE | COMMUNITY

## 2021-09-08 RX ORDER — HYDROXYZINE HYDROCHLORIDE 25 MG/1
1 TABLET AS NEEDED TABLET, FILM COATED ORAL
Status: ACTIVE | COMMUNITY

## 2021-09-08 RX ORDER — FAMOTIDINE 40 MG/1
1 TABLET AT BEDTIME TABLET, FILM COATED ORAL ONCE A DAY
Status: ACTIVE | COMMUNITY

## 2021-09-08 RX ORDER — SUCRALFATE 1 G/1
TABLET ORAL
Qty: 0 | Refills: 0 | Status: ACTIVE | COMMUNITY

## 2021-09-08 RX ORDER — DOXYCYCLINE HYCLATE 100 MG/1
1 TABLET TABLET, DELAYED RELEASE ORAL ONCE A DAY
Status: ACTIVE | COMMUNITY

## 2021-09-08 RX ORDER — NYSTATIN 100000 [USP'U]/G
1 APPLICATION CREAM TOPICAL TWICE A DAY
Status: ACTIVE | COMMUNITY

## 2021-09-08 RX ORDER — ASPIRIN 81 MG/1
TABLET, COATED ORAL
Qty: 0 | Refills: 0 | Status: ACTIVE | COMMUNITY

## 2021-09-08 RX ORDER — CONJUGATED ESTROGENS 0.62 MG/G
AS DIRECTED CREAM VAGINAL
Status: ACTIVE | COMMUNITY

## 2021-09-08 RX ORDER — PREGABALIN 200 MG/1
1 CAPSULE 1 TO 3 HOURS BEFORE BEDTIME CAPSULE ORAL ONCE A DAY
Status: ACTIVE | COMMUNITY

## 2021-09-08 RX ORDER — BUPROPION HYDROCHLORIDE 150 MG/1
TABLET, FILM COATED, EXTENDED RELEASE ORAL
Qty: 0 | Refills: 0 | Status: ACTIVE | COMMUNITY

## 2021-09-08 RX ORDER — ISOSORBIDE MONONITRATE 60 MG/1
1 TABLET IN THE MORNING TABLET, EXTENDED RELEASE ORAL ONCE A DAY
Status: ACTIVE | COMMUNITY

## 2021-09-08 RX ORDER — PANTOPRAZOLE SODIUM 40 MG/1
1 TABLET TABLET, DELAYED RELEASE ORAL TWICE DAILY
Qty: 60 | Refills: 2 | Status: ACTIVE | COMMUNITY

## 2021-09-08 RX ORDER — ROSUVASTATIN CALCIUM 40 MG/1
TABLET, FILM COATED ORAL
Qty: 0 | Refills: 0 | Status: ACTIVE | COMMUNITY

## 2021-09-08 RX ORDER — LINAGLIPTIN 5 MG/1
1 TABLET TABLET, FILM COATED ORAL ONCE A DAY
Status: ACTIVE | COMMUNITY

## 2021-09-08 RX ORDER — MILNACIPRAN HYDROCHLORIDE 50 MG/1
TABLET, FILM COATED ORAL
Qty: 0 | Refills: 0 | Status: ACTIVE | COMMUNITY

## 2021-09-08 RX ORDER — NEOMYCIN SULFATE 500 MG/1
1 TABLET TABLET ORAL BID
Qty: 28 TABLET | Refills: 0 | Status: ACTIVE | COMMUNITY
Start: 2021-03-29

## 2021-09-20 ENCOUNTER — TELEPHONE ENCOUNTER (OUTPATIENT)
Dept: URBAN - METROPOLITAN AREA CLINIC 54 | Facility: CLINIC | Age: 76
End: 2021-09-20

## 2021-09-30 ENCOUNTER — OFFICE VISIT (OUTPATIENT)
Dept: URBAN - METROPOLITAN AREA CLINIC 54 | Facility: CLINIC | Age: 76
End: 2021-09-30
Payer: MEDICARE

## 2021-09-30 ENCOUNTER — TELEPHONE ENCOUNTER (OUTPATIENT)
Dept: URBAN - METROPOLITAN AREA CLINIC 54 | Facility: CLINIC | Age: 76
End: 2021-09-30

## 2021-09-30 ENCOUNTER — WEB ENCOUNTER (OUTPATIENT)
Dept: URBAN - METROPOLITAN AREA CLINIC 54 | Facility: CLINIC | Age: 76
End: 2021-09-30

## 2021-09-30 VITALS
SYSTOLIC BLOOD PRESSURE: 121 MMHG | HEIGHT: 63 IN | BODY MASS INDEX: 35.97 KG/M2 | DIASTOLIC BLOOD PRESSURE: 70 MMHG | WEIGHT: 203 LBS | TEMPERATURE: 97.2 F

## 2021-09-30 DIAGNOSIS — Z86.010 HISTORY OF COLON POLYPS: ICD-10-CM

## 2021-09-30 DIAGNOSIS — R05 COUGH: ICD-10-CM

## 2021-09-30 DIAGNOSIS — K21.9 GERD: ICD-10-CM

## 2021-09-30 DIAGNOSIS — K22.4 ESOPHAGEAL DYSMOTILITY: ICD-10-CM

## 2021-09-30 PROBLEM — 428283002: Status: ACTIVE | Noted: 2021-09-30

## 2021-09-30 PROBLEM — 235595009 GASTROESOPHAGEAL REFLUX DISEASE: Status: ACTIVE | Noted: 2021-03-12

## 2021-09-30 PROCEDURE — 99214 OFFICE O/P EST MOD 30 MIN: CPT | Performed by: REGISTERED NURSE

## 2021-09-30 RX ORDER — NEOMYCIN SULFATE 500 MG/1
1 TABLET TABLET ORAL BID
Qty: 28 TABLET | Refills: 0 | Status: ACTIVE | COMMUNITY
Start: 2021-03-29

## 2021-09-30 RX ORDER — CONJUGATED ESTROGENS 0.62 MG/G
AS DIRECTED CREAM VAGINAL
Status: ACTIVE | COMMUNITY

## 2021-09-30 RX ORDER — DOXYCYCLINE HYCLATE 100 MG/1
1 TABLET TABLET, DELAYED RELEASE ORAL ONCE A DAY
Status: ACTIVE | COMMUNITY

## 2021-09-30 RX ORDER — ASPIRIN 81 MG/1
TABLET, COATED ORAL
Qty: 0 | Refills: 0 | Status: ACTIVE | COMMUNITY

## 2021-09-30 RX ORDER — BUPROPION HYDROCHLORIDE 150 MG/1
TABLET, FILM COATED, EXTENDED RELEASE ORAL
Qty: 0 | Refills: 0 | Status: ACTIVE | COMMUNITY

## 2021-09-30 RX ORDER — PANTOPRAZOLE SODIUM 40 MG/1
1 TABLET TABLET, DELAYED RELEASE ORAL TWICE DAILY
Qty: 60 | Refills: 2 | Status: ACTIVE | COMMUNITY

## 2021-09-30 RX ORDER — LINAGLIPTIN 5 MG/1
1 TABLET TABLET, FILM COATED ORAL ONCE A DAY
Status: ACTIVE | COMMUNITY

## 2021-09-30 RX ORDER — ROSUVASTATIN CALCIUM 40 MG/1
TABLET, FILM COATED ORAL
Qty: 0 | Refills: 0 | Status: ACTIVE | COMMUNITY

## 2021-09-30 RX ORDER — ISOSORBIDE MONONITRATE 60 MG/1
1 TABLET IN THE MORNING TABLET, EXTENDED RELEASE ORAL ONCE A DAY
Status: ACTIVE | COMMUNITY

## 2021-09-30 RX ORDER — PREGABALIN 200 MG/1
1 CAPSULE 1 TO 3 HOURS BEFORE BEDTIME CAPSULE ORAL ONCE A DAY
Status: ACTIVE | COMMUNITY

## 2021-09-30 RX ORDER — MELATONIN 5 MG
1 TABLET IN THE EVENING TABLET ORAL ONCE A DAY
Status: ACTIVE | COMMUNITY

## 2021-09-30 RX ORDER — SUCRALFATE 1 G/1
TABLET ORAL
Qty: 0 | Refills: 0 | Status: ACTIVE | COMMUNITY

## 2021-09-30 RX ORDER — MILNACIPRAN HYDROCHLORIDE 50 MG/1
TABLET, FILM COATED ORAL
Qty: 0 | Refills: 0 | Status: ACTIVE | COMMUNITY

## 2021-09-30 RX ORDER — FAMOTIDINE 40 MG/1
1 TABLET AT BEDTIME TABLET, FILM COATED ORAL ONCE A DAY
Status: ACTIVE | COMMUNITY

## 2021-09-30 RX ORDER — NYSTATIN 100000 [USP'U]/G
1 APPLICATION CREAM TOPICAL TWICE A DAY
Status: ACTIVE | COMMUNITY

## 2021-09-30 RX ORDER — HYDROXYZINE HYDROCHLORIDE 25 MG/1
1 TABLET AS NEEDED TABLET, FILM COATED ORAL
Status: ACTIVE | COMMUNITY

## 2021-09-30 NOTE — HPI-TODAY'S VISIT:
The patient is a 73 year old female with PMH of CAD, HTN, HLD, DM II, obesity, s/p Nissen fundoplication, anxiety, depression, arthritis and anemia, who presents on referral from [facility] [place], for a gastroenterology evaluation for above issues. She was recently hospitalized from 3/25-2/27 with similar complaints and  underwent cardiac evaluation. She had a LHC on 3/25 that showed Ost LM lesion is 50% stenosed. She is currenlty managed on ASA. She reports chronic intermittent chest pressure with associated heartburn, indigestion, reflux, nausea and dysphagia, now worse in past 2 months. She has been taking Protonix, Zantac and Carafate and has not been getting much relief. She sleeps on a wedge pillow due to reflux. Reports dysphagia to pills only. Denies any vomiting, odynophagia, unintentional weight loss. Her last EGD was several years ago by Dr. Alberto that required esophageal dilatation. Her last colonoscopy was in 2017 by Dr. Alberto that was normal. She has chronic constipation and has been taking Linzess 145 mcg daily for over a year. She has a BM every 4-5 days. She also reports bloating. She is taking OTC probiotic. Denies any hematochezia or melena. No family history of GI malignancy. She denies any etoh, tobacco or illicit drug use.    Follow Up 6/20/2019: Patient presents for routine follow up. She c/o early Am nausea and fullness. She tried Amitiza which did not work. Linzess has been too strong for her in the past. EGD was negative for esophageal obstruction with intact Nissen. She is on Savella and Lyrica for fibromyalgia. She is off Amitryptiline. These symptoms have been going on for several years. She is most bothered by constipation and bloating. LFTs elevation noted. No known risk factors.  Follow Up 9/20/2019: Pateint presents to schedule colonoscopy. Bowel function is unchanged at this time with alternating and diarrhea and constipation with bloating. No fevers, chills, weight loss. Dietary recs given in past but complaince is not sure. She has gained 13 lbs but c/o poor appetite.  Follow Up 2/28/20: Patient presents for routiner follow up. She c/o heartuburn, regurgitation with intermittent dysphagia. EGD in 2019 was negative for erosive/obstructive pathology. She is on Protonix 40 QD and Ranitidine at bedtime. She sleeps on a wedge. Dinner is usually around 7 and sleeps at 10. Comorbidities include fibromyalgia, RLS. She has gained 6 lbs.  Follow Up 7/20/20: Pateint presents for routine follow up. No new complaints. She has contacted [facility] and is following the low FODMAP diet with good results.  FOllow up 1/8/21: Pt presents with c/o dysphagia, nausea, esophageal spasms and worsening acid reflux. She currently takes Protonix 40 mg daily and Pepcid 40 mg at bedtime, which is not helping. States she had an episode of choking recently and had to have heimlich maneuvor performed.  Follow Up 3/12/21: Patient presents for post EGD follow up. EGD showed C0M2 barretts but negative pathology. Previous EGD in 2018 showed C0M1 [facility] and also negative pathology. Esophagus was nonobstructive and dilated with 54 F Savary with good results. She c/o fecal incontinence and has to wear pads. Symptoms going of for a year. She denies watery stool. She has tried fiber which caused ? constipation. Her last HbA1C was 7%.  7-22-21 Pt reports that she still has dysphagia.  Pt reports that she had esophageal dilation and not without relief. Pt rpeorts that she has terrible acid reflux worse at night. Pt with gastroparesis and is taking pain medications as well on regular basis.  9/30/21: Pt RTC for f/u. Had EGD 9/8/21: - [place]-colored mucosa classified as [facility]'s stage C0-M1 per [place] criteria. Biopsied. - Abnormal esophageal motility, suspicious for achalasia vs hypertonic post surgical . Dilated. - A medium amount of a phytobezoar in the stomach. - Normal examined duodenum. Bx c/w acid reflux   She continues to c/o intermittent acid reflux and dysphagia. Symptoms improved overall. She is taking Protonix 40 mg BID, Pepcid nightly  and Carafate TID. She reports acute cough and mucous in chest that started yesterday. States she slid off her pillow overnight and it caused "choking" episode of bile. Denies fever or chills.

## 2021-10-04 PROBLEM — 266434009: Status: ACTIVE | Noted: 2021-09-20

## 2021-10-11 ENCOUNTER — OFFICE VISIT (OUTPATIENT)
Dept: URBAN - METROPOLITAN AREA MEDICAL CENTER 28 | Facility: MEDICAL CENTER | Age: 76
End: 2021-10-11
Payer: MEDICARE

## 2021-10-11 ENCOUNTER — TELEPHONE ENCOUNTER (OUTPATIENT)
Dept: URBAN - METROPOLITAN AREA CLINIC 54 | Facility: CLINIC | Age: 76
End: 2021-10-11

## 2021-10-11 DIAGNOSIS — R13.19 CERVICAL DYSPHAGIA: ICD-10-CM

## 2021-10-11 DIAGNOSIS — K21.9 ACID REFLUX: ICD-10-CM

## 2021-10-11 PROCEDURE — 91010 ESOPHAGUS MOTILITY STUDY: CPT | Performed by: INTERNAL MEDICINE

## 2021-11-09 ENCOUNTER — OFFICE VISIT (OUTPATIENT)
Dept: URBAN - NONMETROPOLITAN AREA CLINIC 4 | Facility: CLINIC | Age: 76
End: 2021-11-09

## 2024-01-05 ENCOUNTER — OFFICE VISIT (OUTPATIENT)
Dept: URBAN - METROPOLITAN AREA CLINIC 54 | Facility: CLINIC | Age: 79
End: 2024-01-05
Payer: MEDICARE

## 2024-01-05 ENCOUNTER — DASHBOARD ENCOUNTERS (OUTPATIENT)
Age: 79
End: 2024-01-05

## 2024-01-05 VITALS
TEMPERATURE: 97.2 F | HEIGHT: 63 IN | HEART RATE: 68 BPM | BODY MASS INDEX: 34.02 KG/M2 | SYSTOLIC BLOOD PRESSURE: 136 MMHG | WEIGHT: 192 LBS | DIASTOLIC BLOOD PRESSURE: 74 MMHG

## 2024-01-05 DIAGNOSIS — K22.4 ESOPHAGEAL DYSMOTILITY: ICD-10-CM

## 2024-01-05 DIAGNOSIS — K21.9 GERD: ICD-10-CM

## 2024-01-05 DIAGNOSIS — Z86.010 HISTORY OF COLON POLYPS: ICD-10-CM

## 2024-01-05 PROCEDURE — 99213 OFFICE O/P EST LOW 20 MIN: CPT | Performed by: PHYSICIAN ASSISTANT

## 2024-01-05 RX ORDER — ONDANSETRON 4 MG/1
1 TABLET ON THE TONGUE AND ALLOW TO DISSOLVE TABLET, ORALLY DISINTEGRATING ORAL ONCE A DAY
Status: ACTIVE | COMMUNITY

## 2024-01-05 RX ORDER — NEOMYCIN SULFATE 500 MG/1
1 TABLET TABLET ORAL BID
Qty: 28 TABLET | Refills: 0 | Status: ACTIVE | COMMUNITY
Start: 2021-03-29

## 2024-01-05 RX ORDER — ASPIRIN 81 MG/1
TABLET, COATED ORAL
Qty: 0 | Refills: 0 | Status: ACTIVE | COMMUNITY

## 2024-01-05 RX ORDER — HYDROXYZINE HYDROCHLORIDE 25 MG/1
1 TABLET AS NEEDED TABLET, FILM COATED ORAL
Status: ACTIVE | COMMUNITY

## 2024-01-05 RX ORDER — ROSUVASTATIN CALCIUM 40 MG/1
TABLET, FILM COATED ORAL
Qty: 0 | Refills: 0 | Status: ACTIVE | COMMUNITY

## 2024-01-05 RX ORDER — CYCLOSPORINE 0.5 MG/ML
1 DROP INTO AFFECTED EYE EMULSION OPHTHALMIC TWICE A DAY
Status: ACTIVE | COMMUNITY

## 2024-01-05 RX ORDER — AMLODIPINE BESYLATE 5 MG/1
1 TABLET TABLET ORAL ONCE A DAY
Status: ACTIVE | COMMUNITY

## 2024-01-05 RX ORDER — PANTOPRAZOLE SODIUM 40 MG/1
1 TABLET TABLET, DELAYED RELEASE ORAL TWICE DAILY
Qty: 60 | Refills: 2 | Status: ACTIVE | COMMUNITY

## 2024-01-05 RX ORDER — PREGABALIN 200 MG/1
1 CAPSULE 1 TO 3 HOURS BEFORE BEDTIME CAPSULE ORAL ONCE A DAY
Status: ACTIVE | COMMUNITY

## 2024-01-05 RX ORDER — MILNACIPRAN HYDROCHLORIDE 50 MG/1
TABLET, FILM COATED ORAL
Qty: 0 | Refills: 0 | Status: ACTIVE | COMMUNITY

## 2024-01-05 RX ORDER — ESTRADIOL 0.1 MG/G
AS DIRECTED CREAM VAGINAL
Status: ACTIVE | COMMUNITY

## 2024-01-05 RX ORDER — CONJUGATED ESTROGENS 0.62 MG/G
AS DIRECTED CREAM VAGINAL
Status: ACTIVE | COMMUNITY

## 2024-01-05 RX ORDER — PIOGLITAZONE 15 MG/1
1 TABLET TABLET ORAL ONCE A DAY
Status: ACTIVE | COMMUNITY

## 2024-01-05 RX ORDER — METFORMIN HYDROCHLORIDE 500 MG/1
1 TABLET WITH A MEAL TABLET, FILM COATED ORAL ONCE A DAY
Status: ACTIVE | COMMUNITY

## 2024-01-05 RX ORDER — FUROSEMIDE 20 MG/1
1 TABLET TABLET ORAL ONCE A DAY
Status: ACTIVE | COMMUNITY

## 2024-01-05 RX ORDER — BUPROPION HYDROCHLORIDE 150 MG/1
TABLET, FILM COATED, EXTENDED RELEASE ORAL
Qty: 0 | Refills: 0 | Status: ACTIVE | COMMUNITY

## 2024-01-05 RX ORDER — SODIUM, POTASSIUM,MAG SULFATES 17.5-3.13G
177ML SOLUTION, RECONSTITUTED, ORAL ORAL 1
Qty: 1 | Refills: 0 | OUTPATIENT
Start: 2024-01-05 | End: 2024-01-06

## 2024-01-05 RX ORDER — CYCLOBENZAPRINE HYDROCHLORIDE 10 MG/1
1 TABLET AT BEDTIME AS NEEDED TABLET, FILM COATED ORAL ONCE A DAY
Status: ACTIVE | COMMUNITY

## 2024-01-05 RX ORDER — AZELASTINE HYDROCHLORIDE 137 UG/1
1 PUFF IN EACH NOSTRIL SPRAY, METERED NASAL TWICE A DAY
Status: ACTIVE | COMMUNITY

## 2024-01-05 RX ORDER — SUCRALFATE 1 G/1
TABLET ORAL
Qty: 0 | Refills: 0 | Status: ACTIVE | COMMUNITY

## 2024-01-05 RX ORDER — FAMOTIDINE 40 MG/1
1 TABLET AT BEDTIME TABLET, FILM COATED ORAL ONCE A DAY
Status: ACTIVE | COMMUNITY

## 2024-01-05 RX ORDER — ISOSORBIDE MONONITRATE 30 MG/1
1 TABLET IN THE MORNING TABLET, EXTENDED RELEASE ORAL ONCE A DAY
Status: ACTIVE | COMMUNITY

## 2024-01-05 RX ORDER — ASCORBIC ACID 1000 MG
1 CAPSULE TABLET ORAL ONCE A DAY
Refills: 0 | Status: ACTIVE | COMMUNITY

## 2024-01-05 NOTE — HPI-TODAY'S VISIT:
The patient is a 73 year old female with PMH of CAD, HTN, HLD, DM II, obesity, s/p Nissen fundoplication, anxiety, depression, arthritis and anemia, who presents on referral from [facility], for a gastroenterology evaluation for above issues. She was recently hospitalized from 3/25-2/27 with similar complaints and  underwent cardiac evaluation. She had a LHC on 3/25 that showed Ost LM lesion is 50% stenosed. She is currenlty managed on ASA. She reports chronic intermittent chest pressure with associated heartburn, indigestion, reflux, nausea and dysphagia, now worse in past 2 months. She has been taking Protonix, Zantac and Carafate and has not been getting much relief. She sleeps on a wedge pillow due to reflux. Reports dysphagia to pills only. Denies any vomiting, odynophagia, unintentional weight loss. Her last EGD was several years ago by Dr. Alberto that required esophageal dilatation. Her last colonoscopy was in 2017 by Dr. Alberto that was normal. She has chronic constipation and has been taking Linzess 145 mcg daily for over a year. She has a BM every 4-5 days. She also reports bloating. She is taking OTC probiotic. Denies any hematochezia or melena. No family history of GI malignancy. She denies any etoh, tobacco or illicit drug use.    Follow Up 6/20/2019: Patient presents for routine follow up. She c/o early Am nausea and fullness. She tried Amitiza which did not work. Linzess has been too strong for her in the past. EGD was negative for esophageal obstruction with intact Nissen. She is on Savella and Lyrica for fibromyalgia. She is off Amitryptiline. These symptoms have been going on for several years. She is most bothered by constipation and bloating. LFTs elevation noted. No known risk factors.  Follow Up 9/20/2019: Pateint presents to schedule colonoscopy. Bowel function is unchanged at this time with alternating and diarrhea and constipation with bloating. No fevers, chills, weight loss. Dietary recs given in past but complaince is not sure. She has gained 13 lbs but c/o poor appetite.  Follow Up 2/28/20: Patient presents for routiner follow up. She c/o heartuburn, regurgitation with intermittent dysphagia. EGD in 2019 was negative for erosive/obstructive pathology. She is on Protonix 40 QD and Ranitidine at bedtime. She sleeps on a wedge. Dinner is usually around 7 and sleeps at 10. Comorbidities include fibromyalgia, RLS. She has gained 6 lbs.  Follow Up 7/20/20: Pateint presents for routine follow up. No new complaints. She has contacted [facility] and is following the low FODMAP diet with good results.  FOllow up 1/8/21: Pt presents with c/o dysphagia, nausea, esophageal spasms and worsening acid reflux. She currently takes Protonix 40 mg daily and Pepcid 40 mg at bedtime, which is not helping. States she had an episode of choking recently and had to have heimlich maneuvor performed.  Follow Up 3/12/21: Patient presents for post EGD follow up. EGD showed C0M2 barretts but negative pathology. Previous EGD in 2018 showed C0M1 [facility] and also negative pathology. Esophagus was nonobstructive and dilated with 54 F Savary with good results. She c/o fecal incontinence and has to wear pads. Symptoms going of for a year. She denies watery stool. She has tried fiber which caused ? constipation. Her last HbA1C was 7%.  7-22-21 Pt reports that she still has dysphagia.  Pt reports that she had esophageal dilation and not without relief. Pt rpeorts that she has terrible acid reflux worse at night. Pt with gastroparesis and is taking pain medications as well on regular basis.  9/30/21: Pt RTC for f/u. Had EGD 9/8/21: - [place]-colored mucosa classified as [facility]'s stage C0-M1 per [place] criteria. Biopsied. - Abnormal esophageal motility, suspicious for achalasia vs hypertonic post surgical. Dilated. - A medium amount of a phytobezoar in the stomach. - Normal examined duodenum. Bx c/w acid reflux   She continues to c/o intermittent acid reflux and dysphagia. Symptoms improved overall. She is taking Protonix 40 mg BID, Pepcid nightly  and Carafate TID. She reports acute cough and mucous in chest that started yesterday. States she slid off her pillow overnight and it caused "choking" episode of bile. Denies fever or chills.  1-5-24 Patient presents for surveillance colonoscopy. No persistent diarrhea or constipation. No melena or rectal bleeding. Reports improvement in dysphagia. Denies abdominal pain. No blood thinners.

## 2024-02-14 ENCOUNTER — COLON (OUTPATIENT)
Dept: URBAN - METROPOLITAN AREA SURGERY CENTER 14 | Facility: SURGERY CENTER | Age: 79
End: 2024-02-14

## 2024-03-14 ENCOUNTER — OV EP (OUTPATIENT)
Dept: URBAN - METROPOLITAN AREA CLINIC 54 | Facility: CLINIC | Age: 79
End: 2024-03-14

## 2024-04-08 ENCOUNTER — COLON (OUTPATIENT)
Dept: URBAN - METROPOLITAN AREA SURGERY CENTER 14 | Facility: SURGERY CENTER | Age: 79
End: 2024-04-08
Payer: MEDICARE

## 2024-04-08 DIAGNOSIS — Z09 ENCOUNTER FOR FOLLOW-UP: ICD-10-CM

## 2024-04-08 DIAGNOSIS — Z86.010 PERSONAL HISTORY OF COLON POLYPS: ICD-10-CM

## 2024-04-08 PROCEDURE — G0105 COLORECTAL SCRN; HI RISK IND: HCPCS | Performed by: INTERNAL MEDICINE

## 2024-04-09 ENCOUNTER — COLON (OUTPATIENT)
Dept: URBAN - METROPOLITAN AREA SURGERY CENTER 14 | Facility: SURGERY CENTER | Age: 79
End: 2024-04-09
Payer: MEDICARE

## 2024-04-09 DIAGNOSIS — Z12.11 COLON CANCER SCREENING: ICD-10-CM

## 2024-04-09 PROCEDURE — 992 APS NON BILLABLE: Performed by: INTERNAL MEDICINE
